# Patient Record
(demographics unavailable — no encounter records)

---

## 2025-07-23 NOTE — HISTORY OF PRESENT ILLNESS
[de-identified] :  Status post laparoscopic cholecystectomy on 7/10/25. Patient denies pain, fevers, chills. Reports good energy and appetite.  AVSS No acute distress Abdomen soft, non tender, non distended, port sites healing well  Pathology showed acute gangrenous cholecystitis and cholelithiasis.  Patient recovering well. I went over the pathology report and answered all questions to the Patient's satisfaction. Copy given. Follow up with me PRN.